# Patient Record
Sex: FEMALE | Race: WHITE
[De-identification: names, ages, dates, MRNs, and addresses within clinical notes are randomized per-mention and may not be internally consistent; named-entity substitution may affect disease eponyms.]

---

## 2017-09-24 ENCOUNTER — HOSPITAL ENCOUNTER (OUTPATIENT)
Dept: HOSPITAL 76 - EMS | Age: 82
Discharge: ANOTHER HOSPITAL | End: 2017-09-24
Attending: SURGERY
Payer: MEDICARE
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
## 2017-09-24 DIAGNOSIS — R06.2: ICD-10-CM
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
## 2017-09-24 DIAGNOSIS — R11.10: Primary | ICD-10-CM

## 2018-01-02 ENCOUNTER — HOSPITAL ENCOUNTER (OUTPATIENT)
Dept: HOSPITAL 76 - PC | Age: 83
Discharge: HOME | End: 2018-01-02
Attending: NURSE PRACTITIONER
Payer: MEDICARE

## 2018-01-02 DIAGNOSIS — Z51.5: Primary | ICD-10-CM

## 2018-01-02 DIAGNOSIS — F41.9: ICD-10-CM

## 2018-01-02 DIAGNOSIS — M62.81: ICD-10-CM

## 2018-01-02 DIAGNOSIS — C25.9: ICD-10-CM

## 2018-01-02 DIAGNOSIS — R63.0: ICD-10-CM

## 2018-01-02 DIAGNOSIS — T40.2X5A: ICD-10-CM

## 2018-01-02 DIAGNOSIS — Z85.72: ICD-10-CM

## 2018-01-02 DIAGNOSIS — C78.7: ICD-10-CM

## 2018-01-02 DIAGNOSIS — Z66: ICD-10-CM

## 2018-01-02 DIAGNOSIS — F32.9: ICD-10-CM

## 2018-01-02 DIAGNOSIS — K59.03: ICD-10-CM

## 2018-01-02 DIAGNOSIS — G89.3: ICD-10-CM

## 2018-01-02 DIAGNOSIS — C78.00: ICD-10-CM

## 2018-01-02 DIAGNOSIS — I10: ICD-10-CM

## 2018-01-02 DIAGNOSIS — N28.9: ICD-10-CM

## 2018-01-02 DIAGNOSIS — I95.9: ICD-10-CM

## 2018-01-02 DIAGNOSIS — Z79.891: ICD-10-CM

## 2018-01-02 PROCEDURE — 99345 HOME/RES VST NEW HIGH MDM 75: CPT

## 2018-01-02 NOTE — CONSULTATION NOTE
Palliative Care Consultation





- Referral


Referring Provider: Dr. Mark Up


Time of Visit: 7359-2207


Referral setting: Home


Referral Reason: Pancreatic Cancer/Goals of Care





- Information Sources


Records reviewed: Previous records reviewed (limited)


History/Review of Systems obtained from: Patient


Exam limitations: No limitations





- History of Present Illness


Brief History of Present Illness: 


This is an 89-year-old woman who has had a difficult last few months, most 

recently she presented to [facility] on 17 with increased abdominal 

pain and nausea and vomiting.  She was found on workup to find that she had 

stage IV pancreatic cancer with metastatic lesions in the lung and liver.  This 

was biopsied on 2017 and found to have be well differentiated 

adenocarcinoma.  In discussion with the oncologist on her visit 2017 

given her advanced age and fragility, decision was made not to pursue further 

treatment.  She has had ongoing functional decline.  Her past medical history 

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 

completed in .  She was hospitalized in September, for a GI bleed including 

receiving 4 units of packed red blood cells, at that point in time she also 

ended up with aspiration pneumonia and C. difficile.  She completed treatment 

for C. difficile in 2017.  She presents with fairly well controlled 

right sided abdominal pain, and Duragesic 25 mcg Transderm with Percocet for 

breakthrough pain.  She has multiple medication allergies, and has known 

history of recurrent UTIs.  She has had ongoing functional decline with weight 

loss, and poor appetite attributed to intermittent nausea as well as taste 

changes.








Medical/Surgical History





- Past Medical History


Cardiovascular: reports: Hypertension


Respiratory: reports: Pneumonia (apiration in 2017 during hospitalization Sept.)


Endocrine/Autoimmune: reports: None


GI: reports: GI bleed (duodenal ulcer due to aspirin in ), C.difficile (

finished treatement in October on oral Vanco)


: reports: Renal insuffiency (Stage III)


Psych: reports: Depression


Musculoskeletal: reports: Osteoarthritis (multiple joint replacements), Fatigue

, Chronic back pain


Derm: reports: Psoriasis (scalp)


MRSA Hx?: No


Other Past Medical History: marginal zone lymphoma , treated with Rituxin 

in  in remission





- Past Surgical History


General: reports: Cholecystectomy, Appendectomy, Other (bowel resection 99)


Ortho: reports: Hip replacement, Knee replacement, Rotator cuff repair, Spine 

surgery


HEENT: reports: Cataracts


Derm: reports: Skin cancer surgery





- Substance History


Use: Uses substance without health or social issues: NONE


Abuse: Recurrent use of substance despite neg consequences: NONE





Social History





- Living Situation


Living arrangement: At home


Living Situation: With family (Patient's granddaughter Disha lives with patient

, provides oversight and care.  She lives in the basement.  Her son Chacorta is 

present for visit today, she has another daughter Gypsy who lives in 

possible.  She is  lost her  10 years ago due to complications of 

MRSA.)





Family History





- Family History


Family History: Mother:  (95 complications GI bleed), Father: , 

MI, Sister: Alive and Well





Medications/Allergies





- Medications


Home Medications: 


 Ambulatory Orders











 Medication  Instructions  Recorded  Confirmed


 


Calcium Carbonate [[id number]-Xub-290] 500 mg PO DAILY 18


 


Felodipine [Felodipine ER] 10 mg PO DAILY 18


 


Metoprolol Tartrate 25 mg PO DAILY 18


 


Multivitamin W/Minerals [Theragran 1 tab PO DAILY 18





M]   


 


Ondansetron [Ondansetron Odt] 4 mg PO PRN PRN 18


 


Pantoprazole [Protonix] 40 mg PO BID 18


 


Polyethylene Glycol 3350 [Miralax] 17 gm PO DAILY 18


 


Potassium Chloride 20 meq PO DAILY 18


 


Terazosin [Hytrin] 5 mg PO DAILY 18


 


Ubidecarenone [Co Q-10] 30 mg PO DAILY 18


 


Vit A/Vit C/Vit E/Zinc/Copper 1 cap PO DAILY 18





[Preservision Areds Softgel]   


 


Vitamin D3/Folic Acid [Roxifol-D 500 units PO DAILY 18





Tablet]   


 


fentaNYL [Fentanyl 25mcg patch] 25 mcg TOP DAILY 18


 


oxyCODONE/ACET 5/325 [Percocet 5 1 tab PO Q4HR PRN 18





mg/325 mg]   














- Allergies


Allergies/Adverse Reactions: 


 Allergies











Allergy/AdvReac Type Severity Reaction Status Date / Time


 


levofloxacin Allergy Severe Unknown Verified 18 21:31


 


nitrofurantoin Allergy Severe Rash Verified 18 21:32


 


cephalexin Allergy  Unknown Verified 18 21:28


 


clarithromycin Allergy  Unknown Verified 18 21:29


 


erythromycin base Allergy  Unknown Verified 18 21:29


 


Penicillins Allergy  Itching Verified 18 21:26


 


prednisone Allergy  Rash Verified 18 21:28


 


procaine Allergy  Unknown Verified 18 21:30


 


Tetracyclines Allergy  Unknown Verified 18 21:30


 


ciprofloxacin AdvReac  Nausea Verified 18 21:33


 


epinephrine AdvReac  Headache Verified 18 21:33


 


hydromorphone AdvReac  Emesis Verified 18 21:34


 


latex AdvReac  Itching Verified 18 21:27


 


morphine AdvReac  Nausea Verified 18 21:34


 


ofloxacin AdvReac  Nausea Verified 18 21:35














Review of Systems





- Constitutional


Constitutional: reports: Fatigue, Poor appetite, Night sweats, Weight loss (162 

from baseline a few months ago of 180)





- Eyes


Eyes: reports: Vision loss, Corrective lenses





- Cardiovascular


Cardiovascular: reports: Lightheadedness, Decr. exercise tolerance





- Respiratory


Respiratory: reports: SOB with exertion





- Gastrointestinal


Gastrointestinal: reports: Constipation, Nausea, Bloating, Poor appetite, Early 

satiety





- Genitourinary


Genitourinary: reports: Incontinence (wears pads)





- Musculoskeletal


Musculoskeletal: reports: Muscle pain, Back pain, Muscle aches, Stiffness, 

Muscle weakness





- Integumentary


Integumentary: reports: Dryness





- Neurological


Neurological: reports: General weakness





- Psychiatric


Psychiatric: reports: Depression





- Endocrine


Endocrine: denies: Diabetes type 2, Hypothyroidism





- Hematologic/Lymphatic


Hematologic/Lymphatic: reports: Anemia (had blood transfusion in Sept for GI 

bleed), Recurrent infections (utis)





- All Other Systems


All Other Systems: reports: Reviewed and negative





Physical Exam





- Vital Signs


Temperature: 97.9 C


Pulse Rate: 64


Respiratory Rate: 18


O2 Saturation: 98 (ra @ rest)


Blood Pressure: 112/52





- Physical Exam


General Appearance: positive: Mild distress


Eyes Bilateral: positive: Normal inspection


ENT: positive: No signs of dehydration


Neck: positive: No JVD, Trachea midline


Cardiovascular: positive: Regular rate & rhythm


Respiratory: positive: Diminished in bases


Abdomen: positive: Soft, Abnml bowel sounds (hyperactive), Tenderness (right 

sided tenderness with only slight palpation)


Skin: positive: Pallor, Dryness


Extremities: positive: No pedal edema


Neurologic/Psychiatric: positive: Oriented x3, Weakness, Depressed mood/affect, 

Flat affect





Palliative Care





- POLST


Patient has POLST: Yes


POLST Status: DNR, Comfort Measures (updated with new goals)


Pain: Pain worsening, Location (right side of abdomen; currently controlled on 

fentanyl 25 mcg every 72 hours; has percocet for BTP; needs occasionally at 

night)


Tiredness/Fatigue: Severe (7-10)


Drowsiness/Sedation: None


Nausea: Mild (1-3) (ondansetron intermittently few times a week)


Depression: Mild (1-3)


Anxiety: Mild (1-3)


Dyspnea: Mild (1-3)


Anorexia: Moderate (4-6), Weight loss


Sleep: Variable sleep pattern


Constipation: Yes, Opoid induced, Unmanaged


Feelings of wellbeing/Perceived Quality of Life: Fair, Worsening


Performance Status: 


Patient with increasing fatigue, able to only ambulate short distances.  She is 

independent in her bathing though probably could use some assistance.  She is 

not eating much, her family is providing support for meal prep.She does spend 

most of her time in recliner, I would probably put her at a PPS of 40%








- Palliative Care


Discussion: 


Patient's understanding is that she does have incurable stage IV pancreatic 

cancer, with metastases to the lung and liver.  Her understanding is that 

treatment would not be curative in nature, and actually cause her more 

complications.  I would concur with this given her advanced age and underlying 

frailty.  She does appear somewhat resigned to this, she did talk with a 

hospice representative at the cancer center, but her understanding was that she 

would be able to see her doctors.  Patient's goals are comfort measures only, 

we did update her [facility] to reflect this.  I did discuss with her at length 

given her poor prognosis and her goals of comfort and not wanting to be 

rehospitalized hospice probably was an appropriate service at this point in 

time.  Some of her misunderstanding came from her experience in the past, her 

 10 years ago had hospice support but this was somewhat crisis oriented 

and the context he had complications from her MRSA infection and was only on 

service for less than a week.  Currently her granddaughter Disha, is living 

with her due to complications regarding going through a divorce.  Patient does 

appear somewhat worried by family dynamics, also her son Mari who is present 

for the visit is due to have knee surgery next week.  She does admit to 

depressive symptoms, though she does appear somewhat pragmatic in her approach.

  Counseling regarding palliative versus possible hospice care, the more than 

happy to provide her support did recommend when patient ready to transition to 

hospice so as not crisis oriented as it was with her .  She has had 

contact with hospice of [facility], will look like to pursue a referral with 

them.  She was also counseled [facility] hospice also is available to her if 

wanted








Results





- Lab Results


Lab results reviewed: Yes





Impression and Recommendations





- Palliative Care


Impression: 


This is a frail 89-year-old who presents with stage IV pancreatic cancer with 

metastases to liver and lung, with ongoing functional decline.  She does have 

fairly high symptom burden of pain though currently controlled, weight loss, 

early satiety, and overall generalized weakness.  She does have some 

intermittent shortness of breath.  She is somewhat resigned, and is not 

pursuing further treatment.  Given her poor prognosis and focus on comfort 

measures, would recommend patient transitioning to hospice when she is in 

agreement.





Recommendations/Counseling Done: 


1.  Pain of neoplastic origin.  Patient currently managed on Duragesic 25 mcg 

patch, she is only occasionally using her Percocet for breakthrough pain and 

appears to be tolerating this without any difficulty at this point in time she 

denies need for further prescriptions this is confirmed with her granddaughter 

Disha who came at the end of the visit.


2.  Constipation secondary to opioid use.  Patient's only been using Colace, 

counseling regarding use of MiraLAX instructed to start with 17 g daily, if to 

lose decrease to half capful daily and if no BM in 48 hours increase to twice 

daily.  She has been instructed to use this in 4 ounces of fluids.


3.  Depression.  Patient does present with depressive symptoms, she is feeling 

somewhat overwhelmed not only by her current diagnosis but her family situation 

as well.  She does feel somewhat "off culture" since her   10 years 

ago, son present at visit does appear to be some family dynamics that are 

complicating patient's current situation.


4.  Muscle weakness.  This is multifactorial in origin, including weight loss, 

sedentary status, and progressive disease.  Patient would most likely benefit 

from bathing assist as well as ongoing adaptive equipment as indicated.


5.  Advanced care planning.  Did review the hospice benefit, in the context of 

patient's concern about not seeing her physicians.  When drilled down to 

specifically what she was hoping for, she would like to say goodbye to Dr. Ferreira, she is scheduled to see him next week on 1/10.  In speaking with the 

granddaughter Disha, it is more difficult for her to get to appointments and 

would benefit from receiving her care from the hospice team at home.  Agreed I 

would recheck to hospice regarding pending admit, palliative care to follow but 

given her goals, completion of the [facility], she is hospice appropriate.  Did 

review with family and patient, if would like to pursue hospice admit sooner to 

give me a call and I will facilitate that referral.


6.  Hypotension.  Patient's blood pressure was 112/52 she is on multiple 

medications, will discontinue the hydrochlorothiazide for now.  Requested that 

she keep her blood pressure daily until a new to decrease her medications as 

appropriate as there is a most likely adding to her element of fatigue and 

weakness





Time Spent: 


75 minutes with greater than 50% of this done in counseling regarding advanced 

care planning, can bleeding the [facility], reviewing symptom management and 

anticipatory guidance

## 2018-01-17 ENCOUNTER — HOSPITAL ENCOUNTER (OUTPATIENT)
Dept: HOSPITAL 76 - PC | Age: 83
Discharge: HOME | End: 2018-01-17
Attending: NURSE PRACTITIONER
Payer: MEDICARE

## 2018-01-17 DIAGNOSIS — T40.2X5D: ICD-10-CM

## 2018-01-17 DIAGNOSIS — M62.81: ICD-10-CM

## 2018-01-17 DIAGNOSIS — C78.7: ICD-10-CM

## 2018-01-17 DIAGNOSIS — R10.9: ICD-10-CM

## 2018-01-17 DIAGNOSIS — R68.81: ICD-10-CM

## 2018-01-17 DIAGNOSIS — K59.03: ICD-10-CM

## 2018-01-17 DIAGNOSIS — Z51.5: Primary | ICD-10-CM

## 2018-01-17 DIAGNOSIS — R63.0: ICD-10-CM

## 2018-01-17 DIAGNOSIS — Z66: ICD-10-CM

## 2018-01-17 DIAGNOSIS — F41.9: ICD-10-CM

## 2018-01-17 DIAGNOSIS — C78.00: ICD-10-CM

## 2018-01-17 DIAGNOSIS — R63.4: ICD-10-CM

## 2018-01-17 DIAGNOSIS — F32.9: ICD-10-CM

## 2018-01-17 DIAGNOSIS — I95.9: ICD-10-CM

## 2018-01-17 DIAGNOSIS — C25.9: ICD-10-CM

## 2018-01-17 DIAGNOSIS — G89.3: ICD-10-CM

## 2018-01-17 DIAGNOSIS — Z79.891: ICD-10-CM

## 2018-01-17 PROCEDURE — 99349 HOME/RES VST EST MOD MDM 40: CPT

## 2018-01-17 NOTE — CONSULTATION NOTE
Palliative Care Follow Up





- Referral


Referring Provider: Dr. Leblanc


Time of Visit: 8662-6587


Referral setting: Home (It is a taxing considerable effort for the patient to 

leave the home, also to facilitate family conference.)


Referral Reason: Metastatic pancreatic cancer with mets to lung/liver/Goals of 

Care





- Information Sources


Records reviewed: Previous records reviewed


History/Review of Systems obtained from: Patient, Family


Exam limitations: No limitations





- History of Present Illness Update


Brief HPI Update: 


Please see visit date 1/02 for initial HPI. Is an 89-year-old woman who has had 

a very difficult last few months is been diagnosed with stage IV pancreatic 

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  She has been with anorexia, 

weight loss, right abdominal pain controlled with Transderm fentanyl 25 mcg 

patch, and intermittent constipation.  She has been somewhat ambivalent about 

whether to further pursue treatment.  She had pretty much made up her mind but 

wanted to meet with her regular oncologist  to set whom she had been 

followed with for her stage IV marginal zone lymphoma to get his opinion and to 

essentially say her goodbyes.  At that point in time he did offer her 

chemotherapy that would be gemcitabine and erlotinib.She remains still reticent 

to move forward, and she has had increasing health problems.  She did present 

on 1/15 with what it sounds like TIA, prolonged time where she is unable to 

move her arms or legs was pretty much stuck on the chair.  Finally as things 

resolve she was convinced by her granddaughter to go into the emergency room.  

At that point in time they did do a head CT with contrast and found no acute 

intracranial abnormalities, gave her 2 L, and she was diagnosed with UTI.  

Given the length and description of her symptoms I suspect it is more than 

attributed to her UTI.  She has not had any recurrence of this.  She was put on 

Septra, patient does have multiple antibiotic allergies and is awaiting the CNS 

from the ER to confirm this is appropriate medication.  She was asymptomatic so 

she is and clear if this is working.  She also had presented with some 

dizziness with turning of her head, she does have some residual vertigo with 

this and but has not worsened.





Symptom burden she continues with no appetite, she feels quite washed out, she 

is having functional decline is looking forward to getting assistance with 

bathing, she does feel like her pain is well controlled, I am she is taking 

Ensure 1-2 times a day as well as puddings but given the description from Dr. rivas that she is certainly not nutritionally supported.  She wanted to take 

this time at this visit and to continue to weigh benefits and burdens of 

treatment versus hospice, her Her granddaughter Disha who is her caregiver was 

present as well as her son Chacorta








Social History





- Living Situation


Living arrangement: At home


Living Situation: With family (Patient has always been a Sh[employer] in a  in 

her community, this is been very difficult for her to have functional decline.  

She does not have much energy and has not been able to participate in things 

that usually bring her taiwo.  Her granddaughter and her 2 greatgranjuliana, 

Currently living with them in the basement.  Disha is providing care and is 

currently going to difficult divorce.)





Medications/Allergies





- Medications


Home Medications: 


 Ambulatory Orders











 Medication  Instructions  Recorded  Confirmed


 


Calcium Carbonate [[id number]] 500 mg PO DAILY 01/02/18 01/17/18


 


Felodipine [Felodipine ER] 10 mg PO DAILY 01/02/18 01/17/18


 


Metoprolol Tartrate 12.5 mg PO DAILY 01/02/18 01/17/18


 


Multivitamin W/Minerals [Theragran 1 tab PO DAILY 01/02/18 01/17/18





M]   


 


Ondansetron [Ondansetron Odt] 4 mg PO PRN PRN 01/02/18 01/17/18


 


Pantoprazole [Protonix] 40 mg PO BID 01/02/18 01/17/18


 


Polyethylene Glycol 3350 [Miralax] 17 gm PO DAILY 01/02/18 01/17/18


 


Potassium Chloride 20 meq PO DAILY 01/02/18 01/17/18


 


Terazosin [Hytrin] 5 mg PO DAILY 01/02/18 01/17/18


 


Ubidecarenone [Co Q-10] 30 mg PO DAILY 01/02/18 01/17/18


 


Vit A/Vit C/Vit E/Zinc/Copper 1 cap PO DAILY 01/02/18 01/17/18





[Preservision Areds Softgel]   


 


Vitamin D3/Folic Acid [Roxifol-D 500 units PO DAILY 01/02/18 01/17/18





Tablet]   


 


fentaNYL [Fentanyl 25mcg patch] 25 mcg TOP DAILY 01/02/18 01/17/18


 


oxyCODONE/ACET 5/325 [Percocet 5 1 tab PO Q4HR PRN 01/02/18 01/17/18





mg/325 mg]   














- Allergies


Allergies/Adverse Reactions: 


 Allergies











Allergy/AdvReac Type Severity Reaction Status Date / Time


 


levofloxacin Allergy Severe Unknown Verified 01/02/18 21:31


 


nitrofurantoin Allergy Severe Rash Verified 01/02/18 21:32


 


cephalexin Allergy  Unknown Verified 01/02/18 21:28


 


clarithromycin Allergy  Unknown Verified 01/02/18 21:29


 


erythromycin base Allergy  Unknown Verified 01/02/18 21:29


 


Penicillins Allergy  Itching Verified 01/02/18 21:26


 


prednisone Allergy  Rash Verified 01/02/18 21:28


 


procaine Allergy  Unknown Verified 01/02/18 21:30


 


Tetracyclines Allergy  Unknown Verified 01/02/18 21:30


 


ciprofloxacin AdvReac  Nausea Verified 01/02/18 21:33


 


epinephrine AdvReac  Headache Verified 01/02/18 21:33


 


hydromorphone AdvReac  Emesis Verified 01/02/18 21:34


 


latex AdvReac  Itching Verified 01/02/18 21:27


 


morphine AdvReac  Nausea Verified 01/02/18 21:34


 


ofloxacin AdvReac  Nausea Verified 01/02/18 21:35














Review of Systems





- Constitutional


Constitutional: reports: Fatigue, Poor appetite, Night sweats, Weight loss (163 

at oncologists)





- Ears, Nose & Throat


Ears, Nose & Throat: reports: Postnasal drainage, Dry mouth





- Cardiovascular


Cardiovascular: reports: Exertional dyspnea, Decr. exercise tolerance.  denies: 

Chest pain





- Respiratory


Respiratory: reports: SOB with exertion





- Gastrointestinal


Gastrointestinal: reports: Poor appetite, Early satiety.  denies: Constipation, 

Nausea





- Genitourinary


Genitourinary: denies: Dysuria





- Musculoskeletal


Musculoskeletal: reports: Stiffness, Muscle weakness





- Integumentary


Integumentary: reports: Dryness





- Neurological


Neurological: reports: General weakness, Dizziness (intermittent with moving 

head back and forth)





- Psychiatric


Psychiatric: reports: Depression, Anxiety





- Endocrine


Endocrine: denies: Diabetes type 2, Hypothyroidism





- Hematologic/Lymphatic


Hematologic/Lymphatic: reports: Anemia (35.2), Recurrent infections (history of 

UTIss did not feel she was symptomatic)





- All Other Systems


All Other Systems: reports: Reviewed and negative





Physical Exam





- Vital Signs


Temperature: 97.9 C


Pulse Rate: 54 (recorded 41 this am)


Respiratory Rate: 18


O2 Saturation: 97 (ra@ rest)


Blood Pressure: 112/58





- Physical Exam


General Appearance: positive: No acute distress


Eyes Bilateral: positive: Normal inspection


ENT: positive: No signs of dehydration


Neck: positive: Trachea midline


Cardiovascular: positive: Irregularly irregular


Respiratory: positive: Diminished in bases


Abdomen: positive: Nml bowel sounds, Tenderness (right upper quadrant)


Skin: positive: Pallor, Dryness


Extremities: positive: No pedal edema


Neurologic/Psychiatric: positive: Oriented x3, Depressed mood/affect, Flat 

affect





Palliative Care





- POLST


Patient has POLST: Yes


POLST Status: DNR, Comfort Measures


Pain: Pain unchanged, Location (right quadrant; controlled currently on 

fentanyl 25 mcg rare use of btp)


Tiredness/Fatigue: Severe (7-10)


Drowsiness/Sedation: Mild (1-3)


Nausea: None


Depression: Mild (1-3)


Anxiety: Mild (1-3)


Dyspnea: Mild (1-3)


Anorexia: Weight loss


Sleep: Variable sleep pattern


Constipation: Yes, Opoid induced, Managed


Feelings of wellbeing/Perceived Quality of Life: Fair, Worsening


Performance Status: 


And continues to weaken.  She has recovered from her "spell" he is able to 

ambulate short distances but is still fatiguing quite easily.  She is looking 

forward to having some assistance with bathing, as this is I am very taxing for 

her overall.  I would put her at a PPS of 50% at this point in time she does 

spend most of the time in her recliner, it is really quite cute her great 

grandson climbs in there with her and likes to play fish








- Palliative Care


Discussion: 


Met with Disha her granddaughter and caregiver, Chacorta her son, and patient.  

Reviewed what she had heard from Dr. Ferreira, she remains again quite reticent.

  We did discuss in the context of his note that acknowledge that she needed to 

be doing well nutritionally and also functionally to be able to tolerate 

treatment, she has declined even over the last 2 weeks since I have seen her as 

far as her strength, appetite, and now has had an ED visit.  She has spoken 

with her primary care provider Dr. bellamy tell, she has had input from Dr. Leblanc, her son does not want to wait in our reports "give his opinion".  Her 

granddaughter Disha is very concerned about her having any further side effects 

or decline from treatment, and would like her to spend the rest of her days as 

comfortable as possible.  In the context of this we discussed the role of 

hospice, living into you die, and providing support so she does not need to go 

to the hospital.  She had delayed going for several hours this is very 

difficult for her granddaughter she did not know what to do, and thus the 

support of the hospice team will be key. Anticipatory guidance was provided for 

expected decline, increased dependence and needs, as well as services and 

equipment that could be set applied by hospice.  After weighing benefits and 

burdens patient did decide to move forward with supportive care and transition 

to hospice.This was a great relief to her granddaughter.








Results





- Lab Results


Lab results reviewed: Yes


Lab and Imaging Results: 


1/15 labs from ED Na 134; K 4.4; BUN 20; creat 1.1;total protien 5.2 alb. 3.1; 

liver enzymes okay hgb 12.1; WBC 6.9








Impression and Recommendations





- Palliative Care


Impression: 


This is an 89-year-old woman who continues to have functional decline, presents 

with stage IV pancreatic cancer with metastases to liver and lung.  Her pain is 

currently controlled, but continues with anorexia, early satiety, and overall 

generalized weakness.  After weighing benefits and burdens in counseling with 

family, patient has decided to transition to hospice.





Recommendations/Counseling Done: 


1.  Pain of neoplastic origin.  Patient continues to be controlled on Duragesic 

25 mcg patch, she is only needing occasionally her Percocet for breakthrough 

pain.  She is quite worried about escalating pain, reassured she is on a very 

low dose of her current pain medication with ability to titrate up as needed.  

She is currently satisfied with her current regimen.


2.  Constipation secondary to opioid use.  She has started the MiraLAX daily 

this is working well.  She denies any further problems with this.


3.  Possible TIA.  Given the patient's description of the symptoms, with 

prolonged inability to move her extremities and difficulty getting off the 

couch.  This did resolve enough for her to manually get in the car.  She was 

diagnosed with a UTI, she is at high risk though for hypercoagulability with 

her pancreatic diagnosis.  I





4.  Hypotension patient's blood pressure remains somewhat low, she has 

tolerated being off the hydrochlorothiazide.  She has not been taking her blood 

pressure in a regular basis but in reviewing she has a fairly low pulse rate.  

She was started on metoprolol actually recently after hospitalization for a GI 

bleed.  Given her current symptoms I will decrease her to 12.5 mg in the a.m., 

most of her fatigue and dizziness is in the a.m. we will discontinue the 

evening dose.  She had they are to hold if her pulse is less than 60.  Also 

left instructions to hold the felodipine if her blood pressure is less than 

100.  As she will benefit from continuing titrating off her medications as 

indicated.


5.  Depression.  Patient does present with depressive symptoms somewhat of a 

flat affect as she has been feeling quite overwhelmed not only with her 

decision but also recent family visiting.  She will benefit from further 

psychosocial support of the hospice team





6.  Advanced care planning.  LUIS F FOSS is completed last visit with goals of 

comfort; after family conference has decided transition to hospice.  Given her 

functional decline, and ongoing challenges with nutritional intake I suspect 

this is a good decision for her.  I did follow up with hospice of the [facility]

, they are not available for at least 7-10 days, hospice [facility] was 

available on Friday.  They did choose [facility] related to availability.  I 

reviewed the hospice benefit and was done.  We did discuss last time with her 

 they did not really get a chance to experience the full benefit of the 

team, encouraged her build relationship. Disha her granddaughter, is looking 

forward to the support, and MSW support/direction for how to support her 

children in the home.





Time Spent: 


45 minutes with greater than 50% of this done in counseling regarding weighing 

benefits of burdens of treatment versus hospice and supportive care, reviewing 

symptom management and titrating medications as well as anticipatory guidance.  

Message left that both Dr. Yoder and Dr. Ferreira's office regarding patient's 

decision.